# Patient Record
Sex: FEMALE | Race: WHITE | NOT HISPANIC OR LATINO | Employment: FULL TIME | URBAN - METROPOLITAN AREA
[De-identification: names, ages, dates, MRNs, and addresses within clinical notes are randomized per-mention and may not be internally consistent; named-entity substitution may affect disease eponyms.]

---

## 2023-10-23 ENCOUNTER — TELEPHONE (OUTPATIENT)
Dept: OBSTETRICS AND GYNECOLOGY | Facility: CLINIC | Age: 28
End: 2023-10-23
Payer: COMMERCIAL

## 2023-10-23 NOTE — TELEPHONE ENCOUNTER
Returned patient's call. Has not been seen here before. Is scheduled for NOB visit with Dr. Persaud on 11/3/23. LMP 8/25/23. States she is concerned because she drank 5-6 drinks per night for a couple of weekends around the time of conception. She has had no alcohol since had +UPT 9/25/23. Also states she was smoking almost a pack a day but has cut down to 1 or 2 cigarettes per day. Advised her there is no safe amount of alcohol so we recommend she continue to avoid alcohol use and continue tobacco cessation efforts. She v/u.

## 2023-10-23 NOTE — TELEPHONE ENCOUNTER
lmp 8/25  positive upt 9/25  no prior medical care   Pt NOB on 11/3 rae/ nakul.  Pt stated she was not expecting to get pregnant stated she had been drinking recreationally up until her positive UPT  Pt stated she was concerned because she had been drinking after her conception date  Asked to speak to a nurse as she was concerned about the baby

## 2023-11-02 ENCOUNTER — TELEPHONE (OUTPATIENT)
Dept: OBSTETRICS AND GYNECOLOGY | Facility: CLINIC | Age: 28
End: 2023-11-02
Payer: COMMERCIAL